# Patient Record
Sex: FEMALE | Race: WHITE | URBAN - METROPOLITAN AREA
[De-identification: names, ages, dates, MRNs, and addresses within clinical notes are randomized per-mention and may not be internally consistent; named-entity substitution may affect disease eponyms.]

---

## 2019-02-11 ENCOUNTER — EMERGENCY (EMERGENCY)
Facility: HOSPITAL | Age: 29
LOS: 1 days | Discharge: HOME | End: 2019-02-11
Attending: EMERGENCY MEDICINE | Admitting: EMERGENCY MEDICINE
Payer: COMMERCIAL

## 2019-02-11 VITALS
DIASTOLIC BLOOD PRESSURE: 58 MMHG | HEART RATE: 100 BPM | TEMPERATURE: 98 F | OXYGEN SATURATION: 100 % | SYSTOLIC BLOOD PRESSURE: 124 MMHG | RESPIRATION RATE: 18 BRPM

## 2019-02-11 VITALS
RESPIRATION RATE: 18 BRPM | TEMPERATURE: 97 F | HEART RATE: 90 BPM | OXYGEN SATURATION: 100 % | SYSTOLIC BLOOD PRESSURE: 114 MMHG | DIASTOLIC BLOOD PRESSURE: 63 MMHG | WEIGHT: 149.91 LBS

## 2019-02-11 DIAGNOSIS — Z88.5 ALLERGY STATUS TO NARCOTIC AGENT: ICD-10-CM

## 2019-02-11 DIAGNOSIS — F41.0 PANIC DISORDER [EPISODIC PAROXYSMAL ANXIETY]: ICD-10-CM

## 2019-02-11 DIAGNOSIS — R07.89 OTHER CHEST PAIN: ICD-10-CM

## 2019-02-11 LAB
ALBUMIN SERPL ELPH-MCNC: 3.9 G/DL — SIGNIFICANT CHANGE UP (ref 3.4–5)
ALP SERPL-CCNC: 54 U/L — SIGNIFICANT CHANGE UP (ref 40–120)
ALT FLD-CCNC: 28 U/L — SIGNIFICANT CHANGE UP (ref 12–42)
ANION GAP SERPL CALC-SCNC: 10 MMOL/L — SIGNIFICANT CHANGE UP (ref 9–16)
AST SERPL-CCNC: 23 U/L — SIGNIFICANT CHANGE UP (ref 15–37)
BILIRUB SERPL-MCNC: 0.4 MG/DL — SIGNIFICANT CHANGE UP (ref 0.2–1.2)
BUN SERPL-MCNC: 22 MG/DL — SIGNIFICANT CHANGE UP (ref 7–23)
CALCIUM SERPL-MCNC: 9.1 MG/DL — SIGNIFICANT CHANGE UP (ref 8.5–10.5)
CHLORIDE SERPL-SCNC: 101 MMOL/L — SIGNIFICANT CHANGE UP (ref 96–108)
CO2 SERPL-SCNC: 25 MMOL/L — SIGNIFICANT CHANGE UP (ref 22–31)
CREAT SERPL-MCNC: 0.82 MG/DL — SIGNIFICANT CHANGE UP (ref 0.5–1.3)
D DIMER BLD IA.RAPID-MCNC: <187 NG/ML DDU — SIGNIFICANT CHANGE UP
GLUCOSE SERPL-MCNC: 97 MG/DL — SIGNIFICANT CHANGE UP (ref 70–99)
HCT VFR BLD CALC: 40.6 % — SIGNIFICANT CHANGE UP (ref 34.5–45)
HGB BLD-MCNC: 13.7 G/DL — SIGNIFICANT CHANGE UP (ref 11.5–15.5)
MCHC RBC-ENTMCNC: 30.9 PG — SIGNIFICANT CHANGE UP (ref 27–34)
MCHC RBC-ENTMCNC: 33.7 G/DL — SIGNIFICANT CHANGE UP (ref 32–36)
MCV RBC AUTO: 91.4 FL — SIGNIFICANT CHANGE UP (ref 80–100)
PLATELET # BLD AUTO: 162 K/UL — SIGNIFICANT CHANGE UP (ref 150–400)
POTASSIUM SERPL-MCNC: 4 MMOL/L — SIGNIFICANT CHANGE UP (ref 3.5–5.3)
POTASSIUM SERPL-SCNC: 4 MMOL/L — SIGNIFICANT CHANGE UP (ref 3.5–5.3)
PROT SERPL-MCNC: 7.2 G/DL — SIGNIFICANT CHANGE UP (ref 6.4–8.2)
RBC # BLD: 4.44 M/UL — SIGNIFICANT CHANGE UP (ref 3.8–5.2)
RBC # FLD: 12.2 % — SIGNIFICANT CHANGE UP (ref 10.3–14.5)
SODIUM SERPL-SCNC: 136 MMOL/L — SIGNIFICANT CHANGE UP (ref 132–145)
TROPONIN I SERPL-MCNC: 0.02 NG/ML — SIGNIFICANT CHANGE UP (ref 0.02–0.06)
WBC # BLD: 7.5 K/UL — SIGNIFICANT CHANGE UP (ref 3.8–10.5)
WBC # FLD AUTO: 7.5 K/UL — SIGNIFICANT CHANGE UP (ref 3.8–10.5)

## 2019-02-11 PROCEDURE — 71046 X-RAY EXAM CHEST 2 VIEWS: CPT | Mod: 26

## 2019-02-11 PROCEDURE — 93010 ELECTROCARDIOGRAM REPORT: CPT

## 2019-02-11 PROCEDURE — 99285 EMERGENCY DEPT VISIT HI MDM: CPT | Mod: 25

## 2019-02-11 RX ORDER — ALPRAZOLAM 0.25 MG
0.5 TABLET ORAL ONCE
Qty: 0 | Refills: 0 | Status: DISCONTINUED | OUTPATIENT
Start: 2019-02-11 | End: 2019-02-11

## 2019-02-11 RX ADMIN — Medication 0.5 MILLIGRAM(S): at 20:36

## 2019-02-11 NOTE — ED ADULT NURSE NOTE - CHPI ED NUR SYMPTOMS NEG
no congestion/no back pain/no diaphoresis/no chills/no nausea/no dizziness/no shortness of breath/no fever/no syncope/no vomiting

## 2019-02-11 NOTE — ED PROVIDER NOTE - DIAGNOSTIC INTERPRETATION
ER Physician: John Cheatham  CHEST XRAY INTERPRETATION: lungs clear, heart shadow normal, bony structures intact

## 2019-02-11 NOTE — ED PROVIDER NOTE - OBJECTIVE STATEMENT
29 y.o F with PMHx anxiety and panic attacks presents to the ED with c/o sharp CP worsened with deep breathing x1day. 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 Reports mild CP is not new to her since she gets them during her panic attacks. CP usually resolves on its own. Attempted to lie down horizontally and drank water which helped to relieve sx. When she got up to get some more water, she felt sudden onset dizziness and CP becoming more sharp. Pt is currently menstruating. Admits to being dehydrated. Denies syncope, fever, chills, N/V, abdominal pain, SOB, palpitations 29 y.o F with PMHx anxiety and panic attacks presents to the ED with c/o sharp chest pain worsened with deep breathing x1day. 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 Reports mild chest pain is not new to her since she gets them during her panic attacks. chest pain usually resolves on its own. Attempted to lie down horizontally and drank water which helped to relieve symptoms. When she got up to get some more water, she felt sudden onset dizziness and CP becoming more sharp. Pt is currently menstruating. Admits to being dehydrated. Denies syncope, fever, chills, nausea/vomiting, abdominal pain, shortness of breath, palpitations. 29 y.o F with past medical history of anxiety and panic attacks presents to the emergency room with complains of sharp chest pain worsened with deep breathing x1 day. 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 Reports mild chest pain is not new to her since she gets them during her panic attacks. chest pain usually resolves on its own. Attempted to lie down horizontally and drank water which helped to relieve symptoms. When she got up to get some more water, she felt sudden onset dizziness and chest pain becoming more sharp. Patient is currently menstruating. Admits to being dehydrated. Denies syncope, fever, chills, nausea/vomiting, abdominal pain, shortness of breath, palpitations. 29 y.o F with past medical history of anxiety and panic attacks presents to the emergency room with complains of sharp chest pain worsened with deep breathing x1 day. 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 Reports mild chest pain is not new to her since she gets them during her panic attacks. chest pain usually resolves on its own. Attempted to lie down horizontally and drank water which helped to relieve symptoms. When she got up to get some more water, she felt sudden onset dizziness and chest pain becoming more sharp. Patient is currently menstruating. Admits to being dehydrated. Denies syncope, fever, chills, nausea/vomiting, abdominal pain, shortness of breath, palpitations.  Admits to having Nexplanon implant.

## 2019-02-11 NOTE — ED ADULT TRIAGE NOTE - CHIEF COMPLAINT QUOTE
Pt BIBA for c/o midsternal chest pain and near syncopal episode while at [employer]. Pt denies recent flights/long car trips, SOB or calf pain, + Nexplanon and hx of anxiety, although states this is not typical of her symptoms.

## 2019-02-11 NOTE — ED ADULT NURSE REASSESSMENT NOTE - NS ED NURSE REASSESS COMMENT FT1
Pt resting in bed with friend at the bedside. Pt states that she is starting to feel better. Vitals as per flow sheet. Safety precautions maintained, pending further evaluation. Will continue to monitor.

## 2019-02-11 NOTE — ED PROVIDER NOTE - CONSTITUTIONAL, MLM
normal... Well appearing, well nourished, awake, alert, oriented to person, place, time/situation and in no apparent distress. Anxious, well nourished, awake, alert, oriented to person, place, time/situation

## 2019-02-11 NOTE — ED PROVIDER NOTE - CHPI ED SYMPTOMS NEG
no abdominal pain, no palpitations/no nausea/no syncope/no fever/no vomiting/no chills/no shortness of breath

## 2019-02-11 NOTE — ED PROVIDER NOTE - NSFOLLOWUPINSTRUCTIONS_ED_ALL_ED_FT
Please follow up with your PMD as discussed.  Return if you have worsening pain, shortness of breath, fever, chills, or worsening symptoms.  Take your medication as prescribed.

## 2019-02-11 NOTE — ED PROVIDER NOTE - MEDICAL DECISION MAKING DETAILS
Low risk chest pain.  Likely secondary to panic attack.  Strong family hx of anxiety.  On injectable contraception.  Takes Wellbutrin prn.  Anxious appearing.  Non ischemic ekg.  Lungs clear, no loud murmurs.  Labs, CXR reviewed.  Discussed findings with the patient.  Conservative management discussed with the patient in detail.  Close PMD follow up encouraged.  Strict ED return instructions discussed in detail and patient given the opportunity to ask any questions about their discharge diagnosis and instructions

## 2019-10-25 PROBLEM — F41.0 PANIC DISORDER [EPISODIC PAROXYSMAL ANXIETY]: Chronic | Status: ACTIVE | Noted: 2019-02-11

## 2019-10-25 PROBLEM — F41.9 ANXIETY DISORDER, UNSPECIFIED: Chronic | Status: ACTIVE | Noted: 2019-02-11

## 2019-11-01 ENCOUNTER — APPOINTMENT (OUTPATIENT)
Dept: RHEUMATOLOGY | Facility: CLINIC | Age: 29
End: 2019-11-01
Payer: COMMERCIAL

## 2019-11-01 VITALS
SYSTOLIC BLOOD PRESSURE: 124 MMHG | BODY MASS INDEX: 27.64 KG/M2 | HEIGHT: 66 IN | WEIGHT: 172 LBS | HEART RATE: 92 BPM | TEMPERATURE: 98.3 F | OXYGEN SATURATION: 97 % | DIASTOLIC BLOOD PRESSURE: 73 MMHG

## 2019-11-01 DIAGNOSIS — Z83.3 FAMILY HISTORY OF DIABETES MELLITUS: ICD-10-CM

## 2019-11-01 DIAGNOSIS — Z82.49 FAMILY HISTORY OF ISCHEMIC HEART DISEASE AND OTHER DISEASES OF THE CIRCULATORY SYSTEM: ICD-10-CM

## 2019-11-01 DIAGNOSIS — Z78.9 OTHER SPECIFIED HEALTH STATUS: ICD-10-CM

## 2019-11-01 DIAGNOSIS — R25.2 CRAMP AND SPASM: ICD-10-CM

## 2019-11-01 DIAGNOSIS — M79.10 MYALGIA, UNSPECIFIED SITE: ICD-10-CM

## 2019-11-01 PROBLEM — Z00.00 ENCOUNTER FOR PREVENTIVE HEALTH EXAMINATION: Status: ACTIVE | Noted: 2019-11-01

## 2019-11-01 PROCEDURE — 36415 COLL VENOUS BLD VENIPUNCTURE: CPT

## 2019-11-01 PROCEDURE — 99243 OFF/OP CNSLTJ NEW/EST LOW 30: CPT | Mod: 25

## 2019-11-01 RX ORDER — ETONOGESTREL 68 MG/1
IMPLANT SUBCUTANEOUS
Refills: 0 | Status: ACTIVE | COMMUNITY

## 2019-11-01 RX ORDER — BUSPIRONE HYDROCHLORIDE 7.5 MG/1
TABLET ORAL
Refills: 0 | Status: ACTIVE | COMMUNITY

## 2019-11-01 RX ORDER — BUPROPION HYDROCHLORIDE 300 MG/1
300 TABLET, EXTENDED RELEASE ORAL
Refills: 0 | Status: ACTIVE | COMMUNITY

## 2019-11-04 ENCOUNTER — MESSAGE (OUTPATIENT)
Age: 29
End: 2019-11-04

## 2019-11-04 LAB
25(OH)D3 SERPL-MCNC: 19.8 NG/ML
CK SERPL-CCNC: 714 U/L
CRP SERPL-MCNC: 0.13 MG/DL
ERYTHROCYTE [SEDIMENTATION RATE] IN BLOOD BY WESTERGREN METHOD: 3 MM/HR
MAGNESIUM SERPL-MCNC: 2 MG/DL
VIT B12 SERPL-MCNC: 581 PG/ML

## 2019-11-04 NOTE — HISTORY OF PRESENT ILLNESS
[FreeTextEntry1] : 29 year old woman with referred for evaluation of myalgias.\par Patient reports a long standing history of muscle cramps dating back to her childhood\par Feels cramps in the shoulders, neck, and sometimes in the throat when swallows\par Back, hamstrings, gluteal muscles as well\par chest muscles a lot\par Feels cramping can happen at any time, for many years, not just with exercise\par \par Initially, parents thought pulled muscles \par Sometimes "bad episodes" last about 5-10 minutes, within 10 minutes usually resolves, but can be painful for days a couple of days following \par Cramps have woken her up from sleep\par \par Had evaluation for similar symptoms at the age of 21\par Blood tests and Xray of the cervical spine which was normal\par \par Beginning of September, started to exercise on regular basis, recently evaluated by sports medicine, Dr. Willams and her PMD as well.\par No muscle weakness, no muscle pain at present\par No rashes, no chest pain, or shortness of breath\par No history of RA or SL\par No family history of muscle disease, no family history of rheumatic disease\par No sicca symptoms\par No new medications\par No supplements, tried magnesium supplements, did not notice a benefit\par No dietary imitations\par \par Recent labs reviewed with patient:\par WES negative\par 9/24/19:\par  (2 days post exercise)\par  (5 days post exercise)\par TSH normal\par

## 2019-11-04 NOTE — ASSESSMENT
[FreeTextEntry1] : 29 year old woman with history of muscle cramping referred for evaluation noted to have elevated CPK on recent blood tests.  Patient does not recall a clear trigger for cramping, as can happen at rest or following exercise, does not usually occur during exercise.  No evidence of muscle weakness at this time.  Will repeat CPK, in addition to WES, CRP, ESR, B12, vitamin D and magnesium.  Agree with neurology evaluation and EMG for possible myopathy, further management pending EMG results.

## 2019-11-04 NOTE — REVIEW OF SYSTEMS
[Negative] : Heme/Lymph [Fever] : no fever [Chills] : no chills [Feeling Poorly] : not feeling poorly [Feeling Tired] : not feeling tired [FreeTextEntry9] : muscle cramps

## 2019-11-04 NOTE — PHYSICAL EXAM
[General Appearance - Alert] : alert [General Appearance - In No Acute Distress] : in no acute distress [General Appearance - Well Nourished] : well nourished [General Appearance - Well Developed] : well developed [General Appearance - Well-Appearing] : healthy appearing [Sclera] : the sclera and conjunctiva were normal [Respiration, Rhythm And Depth] : normal respiratory rhythm and effort [Exaggerated Use Of Accessory Muscles For Inspiration] : no accessory muscle use [Auscultation Breath Sounds / Voice Sounds] : lungs were clear to auscultation bilaterally [Heart Rate And Rhythm] : heart rate was normal and rhythm regular [Heart Sounds] : normal S1 and S2 [Edema] : there was no peripheral edema [Veins - Varicosity Changes] : there were no varicosital changes [No Spinal Tenderness] : no spinal tenderness [Abnormal Walk] : normal gait [Nail Clubbing] : no clubbing  or cyanosis of the fingernails [Involuntary Movements] : no involuntary movements were seen [Musculoskeletal - Swelling] : no joint swelling seen [Motor Tone] : muscle strength and tone were normal [Skin Color & Pigmentation] : normal skin color and pigmentation [] : no rash [Skin Lesions] : no skin lesions [No Focal Deficits] : no focal deficits [Oriented To Time, Place, And Person] : oriented to person, place, and time [Impaired Insight] : insight and judgment were intact [Affect] : the affect was normal [Mood] : the mood was normal [FreeTextEntry1] : +ecchymosis on the medial lower leg (from recent exercise class)

## 2019-11-05 LAB — ANA SER IF-ACNC: NEGATIVE

## 2019-12-03 ENCOUNTER — TRANSCRIPTION ENCOUNTER (OUTPATIENT)
Age: 29
End: 2019-12-03

## 2020-09-21 ENCOUNTER — TRANSCRIPTION ENCOUNTER (OUTPATIENT)
Age: 30
End: 2020-09-21

## 2022-10-18 NOTE — ED PROVIDER NOTE - CROS ED SKIN ALL NEG
Follow up with your physician regarding atypical chest pain secondary to cough.  Eat frequent small meals to keep sugar up   negative...
